# Patient Record
Sex: MALE | Race: WHITE
[De-identification: names, ages, dates, MRNs, and addresses within clinical notes are randomized per-mention and may not be internally consistent; named-entity substitution may affect disease eponyms.]

---

## 2019-10-27 ENCOUNTER — HOSPITAL ENCOUNTER (EMERGENCY)
Dept: HOSPITAL 74 - JER | Age: 34
Discharge: HOME | End: 2019-10-27
Payer: SELF-PAY

## 2019-10-27 VITALS — TEMPERATURE: 97.9 F | DIASTOLIC BLOOD PRESSURE: 93 MMHG | HEART RATE: 89 BPM | SYSTOLIC BLOOD PRESSURE: 150 MMHG

## 2019-10-27 VITALS — BODY MASS INDEX: 36.2 KG/M2

## 2019-10-27 DIAGNOSIS — I10: ICD-10-CM

## 2019-10-27 DIAGNOSIS — R07.9: Primary | ICD-10-CM

## 2019-10-27 DIAGNOSIS — Z98.890: ICD-10-CM

## 2019-10-27 LAB
ALBUMIN SERPL-MCNC: 3.9 G/DL (ref 3.4–5)
ALP SERPL-CCNC: 88 U/L (ref 45–117)
ALT SERPL-CCNC: 28 U/L (ref 13–61)
ANION GAP SERPL CALC-SCNC: 7 MMOL/L (ref 8–16)
AST SERPL-CCNC: 17 U/L (ref 15–37)
BASOPHILS # BLD: 0.6 % (ref 0–2)
BILIRUB SERPL-MCNC: 0.5 MG/DL (ref 0.2–1)
BUN SERPL-MCNC: 10.7 MG/DL (ref 7–18)
CALCIUM SERPL-MCNC: 8.8 MG/DL (ref 8.5–10.1)
CHLORIDE SERPL-SCNC: 107 MMOL/L (ref 98–107)
CO2 SERPL-SCNC: 26 MMOL/L (ref 21–32)
CREAT SERPL-MCNC: 0.8 MG/DL (ref 0.55–1.3)
DEPRECATED RDW RBC AUTO: 13.4 % (ref 11.9–15.9)
EOSINOPHIL # BLD: 0.8 % (ref 0–4.5)
GLUCOSE SERPL-MCNC: 94 MG/DL (ref 74–106)
HCT VFR BLD CALC: 48.5 % (ref 35.4–49)
HGB BLD-MCNC: 16.1 GM/DL (ref 11.7–16.9)
LYMPHOCYTES # BLD: 19.8 % (ref 8–40)
MCH RBC QN AUTO: 30.2 PG (ref 25.7–33.7)
MCHC RBC AUTO-ENTMCNC: 33.2 G/DL (ref 32–35.9)
MCV RBC: 91.2 FL (ref 80–96)
MONOCYTES # BLD AUTO: 7.2 % (ref 3.8–10.2)
NEUTROPHILS # BLD: 71.6 % (ref 42.8–82.8)
PLATELET # BLD AUTO: 236 K/MM3 (ref 134–434)
PMV BLD: 7.5 FL (ref 7.5–11.1)
POTASSIUM SERPLBLD-SCNC: 4.1 MMOL/L (ref 3.5–5.1)
PROT SERPL-MCNC: 6.9 G/DL (ref 6.4–8.2)
RBC # BLD AUTO: 5.32 M/MM3 (ref 4–5.6)
SODIUM SERPL-SCNC: 140 MMOL/L (ref 136–145)
WBC # BLD AUTO: 9.2 K/MM3 (ref 4–10)

## 2019-10-27 NOTE — PDOC
History of Present Illness





- General


Chief Complaint: Chest Pain


Stated Complaint: CHEST PAIN


History Source: Patient


Exam Limitations: No Limitations





- History of Present Illness


Initial Comments: 





10/27/19 17:31


Patient is a 33-year-old male with history of ulnar nerve injury secondary to 

severed extensor tendon right arm, tendon repair, hernia repair here with 

complaints of left-sided chest pain x1 month.  This pain has been intermittent 

but has been constant today associated with dizziness described as spinning.  

States his pain is 7/10 pins-and-needles in quality and radiates to his back in 

the same position.  States he has been stress for about a month because he is 

on Workmen's Compensation currently.  He is a smoker 1 pack/day but has 

increased his use recently.  He has had no recent travel, family history 

negative for MI/CVA/PE/DVT.





PMD: [facility]


PMHX: As above


PSOCHX:  (+) cig 1 PPD, (-) etoh, (-) drug


ALL:  NKDA





GENERAL/CONSTITUTIONAL: [No fever or chills. No weakness. No weight change.]


HEAD, EYES, EARS, NOSE AND THROAT: [No change in vision. No ear pain or 

discharge. No sore throat.]


CARDIOVASCULAR: [(+) chest pain, (-) shortness of breath.]


RESPIRATORY: [No cough, wheezing, or hemoptysis.]


GASTROINTESTINAL: [No nausea, vomiting, diarrhea or constipation. No rectal 

bleeding.]


GENITOURINARY: [No dysuria, frequency, or change in urination.]


MUSCULOSKELETAL: [No joint or muscle swelling or pain. No neck or back pain.]


SKIN AND BREASTS: [No rash or easy bruising.]


NEUROLOGIC: [No headache, (+) vertigo, loss of consciousness, or loss of 

sensation.]


PSYCHIATRIC: [No depression or anxiety.]


ENDOCRINE: [No increased thirst. No abnormal weight change.]


HEMATOLOGIC/LYMPHATIC: [No anemia, easy bleeding, or history of blood clots.]


ALLERGIC/IMMUNOLOGIC: [No hives or skin allergy. No latex allergy.]











GENERAL: [The patient is awake, alert, and fully oriented, in no acute distress.

]


HEAD: [Normal with no signs of trauma.]


EYES: [Pupils equal, round and reactive to light, extraocular movements intact, 

sclera anicteric, conjunctiva clear.]


ENT: [Ears normal, nares patent, oropharynx clear without exudates.  Moist 

mucous membranes.]


NECK: [Normal range of motion, supple without lymphadenopathy, JVD, or masses.]


LUNGS: [Breath sounds equal, clear to auscultation bilaterally.  No wheezes, 

and no crackles.]


HEART: [Regular rate and rhythm, normal S1 and S2 without murmur, rub.]


ABDOMEN: [Soft, nontender, normoactive bowel sounds.  No guarding, no rebound.  

No masses.]


EXTREMITIES: [Normal range of motion, no edema.  No clubbing or cyanosis. No 

cords, erythema, or tenderness.]


NEUROLOGICAL: [Cranial nerves II through XII grossly intact.  Normal speech, 

normal gait.]


PSYCH: [Normal mood, normal affect.]


SKIN: [Warm, Dry, normal turgor, no rashes or lesions noted.]











Past History





- Past Medical History


Allergies/Adverse Reactions: 


 Allergies











Allergy/AdvReac Type Severity Reaction Status Date / Time


 


No Known Allergies Allergy   Verified 10/27/19 16:36











Home Medications: 


Ambulatory Orders





Amitriptyline HCl 25 mg PO DAILY 10/27/19 








COPD: No


HTN: Yes (Diet controlled)


Other medical history: angiogram for heart vein malformation ?





- Surgical History


Orthopedic Surgery: Yes (R rotator cuff surgery)





- Immunization History


Immunization Up to Date: No





- Psycho Social/Smoking Cessation Hx


Smoking Status: Yes


Smoking History: Never smoked


Have you smoked in the past 12 months: Yes


Number of Cigarettes Smoked Daily: 4


'Breaking Loose' booklet given: 08/22/13


Hx Alcohol Use: Yes (occasional)


Drug/Substance Use Hx: Yes (marijuana)


Substance Use Type: None





*Physical Exam





- Vital Signs


 Last Vital Signs











Temp Pulse Resp BP Pulse Ox


 


 97.9 F   89   18   150/93   100 


 


 10/27/19 16:29  10/27/19 16:29  10/27/19 16:29  10/27/19 16:29  10/27/19 16:36














ED Treatment Course





- LABORATORY


CBC & Chemistry Diagram: 


 10/27/19 17:30





 10/27/19 17:30





- ADDITIONAL ORDERS


Additional order review: 


 Laboratory  Results











  10/27/19





  17:30


 


Sodium  140


 


Potassium  4.1


 


Chloride  107


 


Carbon Dioxide  26


 


Anion Gap  7 L


 


BUN  10.7


 


Creatinine  0.8


 


Est GFR (CKD-EPI)AfAm  136.03


 


Est GFR (CKD-EPI)NonAf  117.37


 


Random Glucose  94


 


Calcium  8.8


 


Total Bilirubin  0.5


 


AST  17


 


ALT  28


 


Alkaline Phosphatase  88


 


Creatine Kinase  194


 


Creatine Kinase Index  0.6


 


CK-MB (CK-2)  1.2


 


Troponin I  < 0.02


 


Total Protein  6.9


 


Albumin  3.9








 











  10/27/19





  17:30


 


RBC  5.32


 


MCV  91.2


 


MCHC  33.2


 


RDW  13.4


 


MPV  7.5


 


Neutrophils %  71.6


 


Lymphocytes %  19.8  D


 


Monocytes %  7.2


 


Eosinophils %  0.8


 


Basophils %  0.6














- RADIOLOGY


Radiology Studies Ordered: 














 Category Date Time Status


 


 CHEST PA & LAT [RAD] Stat Radiology  10/27/19 17:29 Taken














Medical Decision Making





- Medical Decision Making





10/27/19 17:31


Patient is a 33-year-old male with history of ulnar nerve injury secondary to 

severed extensor tendon right arm, tendon repair, hernia repair here with 

complaints of left-sided chest pain x1 month.  This pain has been intermittent 

but has been constant today associated with dizziness described as spinning.  

States his pain is 7/10 pins-and-needles in quality and radiates to his back in 

the same position.  States he has been stress for about a month because he is 

on Workmen's Compensation currently.  He is a smoker 1 pack/day but has 

increased his use recently.  He has had no recent travel, family history 

negative for MI/CVA/PE/DVT.





Symptoms seems to be consistent with noncardiac chest pain.  However patient is 

a smoker 1 pack cigarette per day will check troponin x1


Labs


Chest x-ray


EKG


Toradol. refused





Review of discharge showed that patient had a normal echo in 2013.


Patient provided further history states that he had a stress test and Holter 

monitor about 5 years ago which had no acute findings.





 Laboratory Tests











  10/27/19 10/27/19





  17:30 17:30


 


WBC   9.2


 


Hgb   16.1


 


Hct   48.5


 


Plt Count   236


 


Sodium  140 


 


Potassium  4.1 


 


Chloride  107 


 


Carbon Dioxide  26 


 


Anion Gap  7 L 


 


BUN  10.7 


 


Troponin I  < 0.02 


























Labs reviewed no acute findings


Chest x-ray negative no acute infiltrates


EKG: Sinus rate 100, LAD, incomplete RBBB, no ST-T wave changes.














Patient feels improved states his chest pain is resolved.





10/27/19 19:10


I discussed the physical exam findings, ancillary test results and final 

diagnoses with the patient. I answered all of the patient's questions. The 

patient was satisfied with the care received and felt comfortable with the 

discharge plan and treatment plan.  The Patient agrees to follow up with the 

primary care physician within 24-72 hours.  Patient instructed to follow-up 

with cardiology.











10/27/19 20:28








Discharge





- Discharge Information


Problems reviewed: Yes


Clinical Impression/Diagnosis: 


Chest pain


Qualifiers:


 Chest pain type: unspecified Qualified Code(s): R07.9 - Chest pain, unspecified





Condition: Stable


Disposition: HOME





- Follow up/Referral


Referrals: 


Anival Shukla MD [Staff Physician] - 





- Patient Discharge Instructions


Additional Instructions: 


Your Discharge Instructions:


You must call primary care physician within 24 hours to arrange follow-up.  

Return to the Emergency Department with any new, persistent or worsening 

symptoms, for fever, chills, SOB, dizziness or any other concerning changes 

that may occur.  Follow up with cardiology 








- Post Discharge Activity

## 2019-10-28 NOTE — EKG
Test Reason : 

Blood Pressure : ***/*** mmHG

Vent. Rate : 100 BPM     Atrial Rate : 100 BPM

   P-R Int : 122 ms          QRS Dur : 098 ms

    QT Int : 360 ms       P-R-T Axes : 021 -20 051 degrees

   QTc Int : 464 ms

 

NORMAL SINUS RHYTHM

INCOMPLETE RIGHT BUNDLE BRANCH BLOCK

POSSIBLE LATERAL INFARCT (CITED ON OR BEFORE 22-AUG-2013)

ABNORMAL ECG

WHEN COMPARED WITH ECG OF 25-AUG-2013 10:36,

NO SIGNIFICANT CHANGE WAS FOUND

Confirmed by ALYSE CAMPA MD ([id number]) on 10/28/2019 3:49:51 PM

 

Referred By:             Confirmed By:ALYSE CAMPA MD

## 2020-09-27 ENCOUNTER — HOSPITAL ENCOUNTER (EMERGENCY)
Dept: HOSPITAL 74 - JER | Age: 35
LOS: 1 days | Discharge: HOME | End: 2020-09-28
Payer: COMMERCIAL

## 2020-09-27 VITALS — BODY MASS INDEX: 31.1 KG/M2

## 2020-09-27 DIAGNOSIS — F09: Primary | ICD-10-CM

## 2020-09-27 LAB
ALP SERPL-CCNC: 87 U/L (ref 45–117)
ALT SERPL-CCNC: 23 U/L (ref 13–61)
AST SERPL-CCNC: 21 U/L (ref 15–37)
BASOPHILS # BLD: 0.6 % (ref 0–2)
BILIRUB SERPL-MCNC: 0.9 MG/DL (ref 0.2–1)
CHLORIDE SERPL-SCNC: 103 MMOL/L (ref 98–107)
CREAT SERPL-MCNC: 0.8 MG/DL (ref 0.55–1.3)
DEPRECATED RDW RBC AUTO: 13.3 % (ref 11.9–15.9)
EOSINOPHIL # BLD: 0.2 % (ref 0–4.5)
HCT VFR BLD CALC: 46.5 % (ref 35.4–49)
HGB BLD-MCNC: 15.8 GM/DL (ref 11.7–16.9)
LYMPHOCYTES # BLD: 24.1 % (ref 8–40)
MCH RBC QN AUTO: 30.7 PG (ref 25.7–33.7)
MCHC RBC AUTO-ENTMCNC: 33.9 G/DL (ref 32–35.9)
MCV RBC: 90.5 FL (ref 80–96)
MONOCYTES # BLD AUTO: 8.5 % (ref 3.8–10.2)
NEUTROPHILS # BLD: 66.6 % (ref 42.8–82.8)
PLATELET # BLD AUTO: 283 K/MM3 (ref 134–434)
PMV BLD: 7.4 FL (ref 7.5–11.1)
POTASSIUM SERPLBLD-SCNC: 3.8 MMOL/L (ref 3.5–5.1)
RBC # BLD AUTO: 5.14 M/MM3 (ref 4–5.6)
WBC # BLD AUTO: 11.4 K/MM3 (ref 4–10)

## 2020-09-27 PROCEDURE — U0003 INFECTIOUS AGENT DETECTION BY NUCLEIC ACID (DNA OR RNA); SEVERE ACUTE RESPIRATORY SYNDROME CORONAVIRUS 2 (SARS-COV-2) (CORONAVIRUS DISEASE [COVID-19]), AMPLIFIED PROBE TECHNIQUE, MAKING USE OF HIGH THROUGHPUT TECHNOLOGIES AS DESCRIBED BY CMS-2020-01-R: HCPCS

## 2020-09-27 NOTE — XMS
Summarization Of Episode

                          Created on:2020



Patient:AALIYAH WASHINGTON

Sex:Male

:1985

External Reference #:[id number]





Demographics







                          Address                   [address]



                                                    [address] [place] [address]

 

                          Home Phone                [phone]

 

                          Work Phone                [phone]

 

                          Preferred Language        English

 

                          Marital Status             or 

 

                          Jewish Affiliation     [place]

 

                          Race                      [facility]

 

                          Ethnic Group               or 









Author







                          Organization              [facility]









Support







                Name            Relationship    Address         Phone

 

                YARITZA DAVIS    SISTER          UNKNOWN         [phone]



                                                [place] [address] 

 

                [employer] Unavailable     [address] [phone]



                                                [place] [address] 

 

                [employer]         [address] [phone]



                                                [address] [place] [id number] 









Re-disclosure Warning

The records that you are about to access may contain information from federally-
assisted alcohol or drug abuse programs. If such information is present, then 
the following federally mandated warning applies: This information has been 
disclosed to you from records protected by federal confidentiality rules (42 CFR
part 2). The federal rules prohibit you from making any further disclosure of 
this information unless further disclosure is expressly permitted by the written
consent of the person to whom it pertains or as otherwise permitted by 42 CFR 
part 2. A general authorization for the release of medical or other information 
is NOT sufficient for this purpose. The Federal rules restrict any use of the 
information to criminally investigate or prosecute any alcohol or drug abuse 
patient.The records that you are about to access may contain highly sensitive 
health information, the redisclosure of which is protected by Article 27-F of 
the [facility] Public Health law. If you continue you may haveaccess to 
information: Regarding HIV / AIDS; Provided by facilities licensed or operated 
by the [facility] Office of Mental Health; or Provided by the [facility]
Office for People With Developmental Disabilities. If such information is 
present, then the following [place] mandated warning applies: This 
information has been disclosed to you from confidential records which are 
protected by state law. State law prohibits you from making any further 
disclosure of this information without the specific written consent of the 
person to whom it pertains, or as otherwise permitted by law. Any unauthorized 
further disclosure in violation of state law may result in a fine or group home 
sentence or both. A general authorization for the release of medical or other 
information is NOT sufficient authorization for further disclosure.



Insurance Providers







          Payer name Policy type Policy ID Covered   Covered party's Policy    P

kori



                    / Coverage           party ID  relationship to [employer]    Inf

ormation



                    type                          [employer]ickers              

 

          SELF PAY                                SP                  



          INSURANCE                                                   

 

          [facility]           [id number]                             [id number]

[id number]



          PLANS

## 2020-09-27 NOTE — PDOC
Documentation entered by Poncho Gomez SCRIBE, acting as scribe for 

Yocasta Alvarenga MD.








Yocasta Alvarenga MD:  This documentation has been prepared by the Jason briones Angel, SCRIBE, under my direction and personally reviewed by me in its 

entirety.  I confirm that the documentation accurately reflects all work, 

treatment, procedures, and medical decision making performed by me.  





Attending Attestation





- Resident


Resident Name: AlenaKyleigh





- ED Attending Attestation


I have performed the following: I have examined & evaluated the patient, The 

case was reviewed & discussed with the resident, I agree w/resident's findings &

plan





- HPI


HPI: 





09/27/20 22:17


The patient is a 35 year old male with a significant past medical history of 

right ulnar entrapment with release who presents to the ED with left sided chest

pressure and anxiety over the past few months. The patient admits to being 

depressed and has been smoking weed and drinking as of recently. The patient 

notes the left sided chest pressure when he is feeling anxious. The patient has 

no other complaints here in the ED. 


09/28/20 00:52


Pt states that he is upset because he has a 1yo and 9yo with baby mama; however 

he fought with her in the past and they broke up. Now at his daughter's recent 

comminion he saw baby mama with her new boyfriend and he is jealous and upset 

and pt is trying to rationalize why he needs to go back and apologize to her etc

etc.


Pt is depressed. Unclear to me if he is suicidal, because he says  that he is 

not; however he also tells me that he has never been this depressed before and 

that he attempted suicide 5x in the past.


Pt works as a  and states that he makes good $. He lives on his own, but 

doesn't want to go back to his apt.


His little sister is at the bedside.


09/28/20 00:55








- Physicial Exam


PE: 





09/27/20 23:48


GENERAL: Awake, alert, and fully oriented, in no acute distress


HEAD: No signs of trauma


EYES: PERRLA, EOMI, sclera anicteric, conjunctiva clear


ENT: Auricles normal inspection, hearing grossly normal, nares patent, 

oropharynx clear without exudates. Moist mucosa


NECK: Normal ROM, supple, no lymphadenopathy, JVD, or masses


LUNGS: Breath sounds equal, clear to auscultation bilaterally.  No wheezes, and 

no crackles


HEART: Regular rate and rhythm, normal S1 and S2, no murmurs, rubs or gallops


ABDOMEN: Soft, nontender, normoactive bowel sounds.  No guarding, no rebound.  

No masses


EXTREMITIES: Normal range of motion, no edema.  No clubbing or cyanosis. No 

cords, erythema, or tenderness


NEUROLOGICAL: Cranial nerves II through XII grossly intact.  Normal speech, 

normal gait


SKIN: Warm, Dry, normal turgor, no rashes or lesions noted.











- Medical Decision Making





09/28/20 00:32


All labs are normal


09/28/20 00:49


Pt is depressed; doesn't want to go home., where he lives alone.  Scared he will

go back to drugs. Needs to be on antidepressants. Pt attempted suicide 5x in the

past; last attempt was 3 yrs ago. Unclear if he wants to hurt himself again. 


Pt has normal labs and normal vitals and normal exam.


Pt is obsessed with his baby mama. Wants to go back to her; though she has a new

boyfriend that is living with her and pt's 2 daughters.


09/28/20 01:19


Psychiatrist will come see patient in the AM


09/28/20 02:34


CXR normal


Pt is stable and awaiting psych eval in the AM





Discharge





- Discharge Information


Problems reviewed: Yes


Clinical Impression/Diagnosis: 


 Psychological disorder





Condition: Stable





- Follow up/Referral





- Patient Discharge Instructions





- Post Discharge Activity


Work/Back to School Note:  My Personal Safety Plan

## 2020-09-27 NOTE — PDOC
History of Present Illness





- General


Chief Complaint: Psychiatric


Stated Complaint: NAUSEA/ANXIETY/DEPRESSION/


Time Seen by Provider: 09/27/20 21:45


History Source: Patient, Family


Exam Limitations: No Limitations





- History of Present Illness


Initial Comments: 


Pt is a 36 yo M, with PMH of , who is presenting from home with his sister for 

psychiatric concerns. Pt states he "has been depressed for about a year" since 

he split up with his fiance, and now has difficulty getting out of bed and quit 

his job. Pt states he feels "very anxious today" and has "pain in the left side 

of my chest and arm when I get anxious for over 2 months". Pt also endorses 

smoking THC daily. Over the past 2 weeks, pt states he "has spent $30,000 and 

tried to get prostitutes". Pt denies SI or HI, but "is worried about going home 

alone by himself". Pt has never seen a psychiatrist or taken any psych 

medications. Pt denies any recent fevers/chills, auditory or visual 

hallucinations, headache, vision changes, syncope, palpitations, SOB, 

nausea/vomiting, abdominal pain, urinary symptoms, diarrhea/constipation, or leg

swelling.





Allergies: NKDA


PCP: None


Psych: None





Social: Pt smokes ~1/4 cigarettes ppd. Smokes THC daily. "binge drinks" on the 

weekends. Denies any other illicit drug use. 


Pt denies any recent travel or sick contacts.


Surgical: no relevant history.


Family: no relevant history.





Past History





- Past Medical History


Allergies/Adverse Reactions: 


Allergies





No Known Allergies Allergy (Verified 10/27/19 16:36)


   








Home Medications: 


Ambulatory Orders





Amitriptyline HCl 25 mg PO DAILY 10/27/19 








Psychosocial History: Yes: depression


Surgical History: Yes: No Surgical History





- Family History


Significant Family History: Yes: no pertinent family hx





- Immunization History


Immunization Up to Date: No





- Social History


Smoking History: Yes


Smoking Status: Current every day smoker


Number of Cigarettes Per Day: 5


Alcohol Use: occasionally


Drug Use: pt denies





*Review of Systems





- Review of Systems


Able to Perform ROS?: Yes


Constitutional: No: Chills, Diaphoresis, Fever


HEENTM: No: Recent change in vision, Nose Congestion, Throat Pain, Throat 

Swelling, Difficulty Swallowing


Respiratory: No: Cough, Orthopnea, Shortness of Breath


Cardiac (ROS): Yes: See HPI, Chest Pain.  No: Edema, Irregular Heart Rate, 

Lightheadedness, Palpitations, Syncope, Chest Tightness


ABD/GI: No: Constipated, Diarrhea, Nausea, Poor Appetite, Poor Fluid Intake, 

Vomiting, Abdominal cramping


: No: Burning, Dysuria, Frequency, Hematuria, Pain, Urgency


Musculoskeletal: No: Back Pain, Muscle Pain


Integumentary: No: Rash


Neurological: No: Headache, Numbness, Weakness, Unsteady Gait, Dizziness


Psychiatric: No: Sleep Pattern Change, Change in Appetite


Endocrine: No: Increased Urine, Change in Weight


Hematologic/Lymphatic: No: Anemia, Blood Clots, Easy Bleeding, Easy Bruising


All Other Systems: Reviewed and Negative





*Physical Exam





- Vital Signs


                                Last Vital Signs











Temp Pulse Resp BP Pulse Ox


 


 98.0 F   76   20   142/75   98 


 


 09/27/20 20:49  09/27/20 20:49  09/27/20 20:49  09/27/20 20:49  09/27/20 20:49














- Physical Exam


Vitals stable, pt afebrile. Pt anxious, but in NAD, obese body habitus. 


Pt alert and oriented x3.


CNs generally intact, muscular strength and sensation intact. 


No midline spinal tenderness, step-offs, or crepitus. 


Head normocephalic, atraumatic.


Eyes PERRLA, EOMI. 


Oropharynx without erythema or exudates, no LAD b/l. 


No nasal congestion. 


Hearing intact. 


Clear heart sounds, S1/S2, no JVD, b/l pedal edema, or heart murmur. No 

reproducible chest wall tenderness.


Clear lung sounds, no respiratory distress, wheezes, crackles, or accessory 

muscle use. 


No abdominal or CVA tenderness to palpation, no rebound, no guarding. Abdomen 

soft, non-distended, and with normoactive bowel sounds. 


Skin without jaundice or rash. 


09/28/20 04:35








Plan





- Order(s)


Order(s): 


Orders last 12 hours











 Category Date Time Status


 


 EKG [ELECTROCARDIOGRAM] [CARD] Stat Cardiology  09/27/20 21:15 Ordered


 


 CBC WITH DIFFERENTIAL Stat Lab  09/27/20 21:54 Uncollected














- Laboratory


CBC & Chemistry Diagram: 


                                 09/27/20 22:45





                                 09/27/20 22:45





- Radiology Study(ies)


Orders: 














 Category Date Time Status


 


 CHEST PA & LAT [RAD] Stat Radiology  09/27/20 21:54 Ordered














Critical Care Time/[facility] Note





- Medical Decision Making


Note: 


Pt was seen at bedside, also will be seen by attending Dr. Alvarenga. Pt presenting 

with history concerning for depression as well as possible manic/hypomanic 

episodes. Pt does not feel safe being at home alone. Will contact psychiatry and

 evaluate for electrolyte imbalances, and ACS r/o.





Provided 5 mg PO valium for improvement of anxiety.


Will continue to reassess pt and monitor for symptomatic improvement.





ECG: NSR, incomplete RBBB (HR 66, , QRS 94, QTc 410). No TWIs or 

significant ST segment changes. No significant changes from prior ECG.


09/28/20 05:59





Labs WNL


Trop <.02 with no new EKG changes


Chest x-ray without evidence of infiltrate or consolidations


Consulted psychiatry, (Amador Drew) who suggested 300 mg PO seroquel, and 

she will see pt in AM.





Pt pending psych evaluation


Pt sleeping after seroquel, signed out to day team pending psych clearance.


09/28/20 06:06








**Discharge Disposition





- Diagnosis


 Psychological disorder








- Discharge Dispostion


Condition at time of disposition: Stable


Last Admission D/C Date: 08/25/13





- Referrals





- Patient Instructions





- Post Discharge Activity


Work/School Note:  My Personal Safety Plan

## 2020-09-28 VITALS — TEMPERATURE: 97.7 F

## 2020-09-28 VITALS — SYSTOLIC BLOOD PRESSURE: 126 MMHG | HEART RATE: 72 BPM | DIASTOLIC BLOOD PRESSURE: 68 MMHG

## 2020-09-28 LAB
ALBUMIN SERPL-MCNC: 4.3 G/DL (ref 3.4–5)
ANION GAP SERPL CALC-SCNC: 10 MMOL/L (ref 8–16)
BUN SERPL-MCNC: 9.6 MG/DL (ref 7–18)
CALCIUM SERPL-MCNC: 9.2 MG/DL (ref 8.5–10.1)
CO2 SERPL-SCNC: 25 MMOL/L (ref 21–32)
GLUCOSE SERPL-MCNC: 84 MG/DL (ref 74–106)
MAGNESIUM SERPL-MCNC: 2.2 MG/DL (ref 1.8–2.4)
PROT SERPL-MCNC: 7.8 G/DL (ref 6.4–8.2)
SODIUM SERPL-SCNC: 138 MMOL/L (ref 136–145)

## 2020-09-28 NOTE — EKG
Test Reason : 

Blood Pressure : ***/*** mmHG

Vent. Rate : 066 BPM     Atrial Rate : 066 BPM

   P-R Int : 116 ms          QRS Dur : 094 ms

    QT Int : 392 ms       P-R-T Axes : 011 -07 035 degrees

   QTc Int : 410 ms

 

NORMAL SINUS RHYTHM

INCOMPLETE RIGHT BUNDLE BRANCH BLOCK

BORDERLINE ECG

WHEN COMPARED WITH ECG OF 27-OCT-2019 16:22,

VENT. RATE HAS DECREASED BY  34 BPM

QT HAS SHORTENED

Confirmed by ALYSE CAMPA MD ([id number]) on 9/28/2020 2:03:07 PM

 

Referred By:             Confirmed By:ALYSE CAMPA MD

## 2020-09-28 NOTE — PDOC
*Physical Exam





- Vital Signs


                                Last Vital Signs











Temp Pulse Resp BP Pulse Ox


 


 97.7 F   65   18   115/65   99 


 


 09/28/20 06:12  09/28/20 06:12  09/28/20 06:12  09/28/20 06:12  09/28/20 06:12














ED Treatment Course





- LABORATORY


CBC & Chemistry Diagram: 


                                 09/27/20 22:45





                                 09/27/20 22:45





- ADDITIONAL ORDERS


Additional order review: 


                               Laboratory  Results











  09/27/20





  22:45


 


Sodium  138


 


Potassium  3.8


 


Chloride  103


 


Carbon Dioxide  25


 


Anion Gap  10


 


BUN  9.6


 


Creatinine  0.8


 


Est GFR (CKD-EPI)AfAm  135.08


 


Est GFR (CKD-EPI)NonAf  116.55


 


Random Glucose  84


 


Calcium  9.2


 


Magnesium  2.2


 


Total Bilirubin  0.9


 


AST  21


 


ALT  23


 


Alkaline Phosphatase  87


 


Troponin I  < 0.02


 


Total Protein  7.8


 


Albumin  4.3








                                        











  09/27/20





  22:45


 


RBC  5.14


 


MCV  90.5


 


MCHC  33.9


 


RDW  13.3


 


MPV  7.4 L


 


Neutrophils %  66.6


 


Lymphocytes %  24.1  D


 


Monocytes %  8.5


 


Eosinophils %  0.2


 


Basophils %  0.6














- Medications


Given in the ED: 


ED Medications














Discontinued Medications














Generic Name Dose Route Start Last Admin





  Trade Name Freq  PRN Reason Stop Dose Admin


 


Diazepam  5 mg  09/27/20 21:55  09/27/20 22:01





  Valium -  PO  09/27/20 21:56  5 mg





  ONCE ONE   Administration


 


Quetiapine Fumarate  300 mg  09/28/20 01:29  09/28/20 01:36





  Seroquel -  PO  09/28/20 01:30  300 mg





  ONCE ONE   Administration














Medical Decision Making





- Medical Decision Making





09/28/20 07:41


Sign out received from Dr Carvajal.





Abdoulaye Acuña is a 34yo man seen overnight with depression and report of manic v

hypomanic episodes over the past few weeks. 


He was medically cleared for discharge overnight but is here waiting to be seen 

by psychiatry. The pt received 5mg PO valium as well as 300mg PO seroquel and 

has been sleeping comfortably. 





09/28/20 09:19


- Seen by psychiatry, Amador Drew. Cleared for discharge and outpatient 

follow up


- Pt reports no questions, understands his follow up plan


- Will discharge home








Discussed with Dr Nassef





Rylie Ramm


PGY3





Discharge





- Discharge Information


Problems reviewed: Yes


Clinical Impression/Diagnosis: 


 Psychological disorder





Condition: Stable


Disposition: HOME





- Admission


No





- Follow up/Referral


Referrals: 


Amador Drew NP [Nurse Practitioner] - 





- Patient Discharge Instructions


Patient Printed Discharge Instructions:  Bipolar Disorder


Additional Instructions: 


Discharge Instructions


You were seen in the emergency department for recent depression and other 

psychological symptoms. You were cleared of any medical abnormality and were 

seen by the hospital psychiatry team. 





You will need to follow up with psychiatry as instructed at [facility]

([phone]).





Seek immediate medical care at the closest hospital for any worsening symptoms, 

thoughts of suicide, thoughts of harming yourself or others, or any other m

edical emergency. 





- Post Discharge Activity


Work/Back to School Note:  My Personal Safety Plan

## 2020-09-28 NOTE — CON.PSY
Psychiatry Consult


Chief Complaint: Patient here for acute onset of Depressive symptoms


History of Present Problem: 





Patient was brought to ER by his family because of his depressive symptoms, He 

reports that he has always been depressed but since January 2019 when he broke 

up with his wife and  moved out, he has been worse. Recently,and what sparked 

his worsening symptoms was when he saw his exwife with her new boyfriend at his 

daughter's communion. He feels alone and has difficiulty sleeping





Symptoms: reports: Depressed Mood, Worthlessness/Guilt, Decreased Energy, 

Impaired Concentration





- Family History


Family History: Denies





- Allergies


Allergies: 


                                    Allergies











Allergy/AdvReac Type Severity Reaction Status Date / Time


 


No Known Allergies Allergy   Verified 10/27/19 16:36














- Current Living Status


Usual Living Arrangement: Alone (inhis own rental apartment)





- Current Mental Status Evaluation


Appearance: Other (normal appearance for the settin)





- Affect


Appropriateness: Appropriate to Content





- Mood


Mood: Depressed, Anxious, Irritable





- Speech/Language


Expressive: Coherent


Receptive: Age Appropriate Comprehension of Spoken Words





- Psychomotor Activity


Psychomotor Activity: Normal





- Thought Process


Thought Process: Intact





- Thought Content


Hallucinations: Absent


Delusions: Absent





- Self Perception


Self Perception: No Impairment





- Cognition


Attention: Alert


Orientation: Time, Person, Place


Memory, Immediate Recall: Intact


Memory, Short Term: 3/3





- Concentration


Simple Calculations Intact: Yes





- Abstraction


Proverb Interpretation: Intact


Judgement: Minimally Impaired





- Insight


Insight: Impaired





- Impulse Control


Impulse Control: Minimally Impaired





- Suicidal Ideation


Suicidal Ideation: No





Assessment/Plan





 Acute streses disorder on chronic depression


Rule out Bipolar


hx 5 suicidal attempt in the past


no hx of psych. history


no psych hospitalization


no psych. meds in the past


Not suicidal at this time/no attempt/no plan


He can be discharged to home








Rec:


Continue Seroquel q hs 


for  now





Patient will come to see me in the [place]  at [facility] in the am 


[phone]





[facility]